# Patient Record
Sex: FEMALE | URBAN - METROPOLITAN AREA
[De-identification: names, ages, dates, MRNs, and addresses within clinical notes are randomized per-mention and may not be internally consistent; named-entity substitution may affect disease eponyms.]

---

## 2024-03-13 ENCOUNTER — OV CON (OUTPATIENT)
Dept: URBAN - METROPOLITAN AREA CLINIC 118 | Facility: CLINIC | Age: 59
End: 2024-03-13
Payer: COMMERCIAL

## 2024-03-13 VITALS
SYSTOLIC BLOOD PRESSURE: 116 MMHG | TEMPERATURE: 97.7 F | HEART RATE: 71 BPM | BODY MASS INDEX: 24.46 KG/M2 | WEIGHT: 146.8 LBS | HEIGHT: 65 IN | DIASTOLIC BLOOD PRESSURE: 73 MMHG

## 2024-03-13 DIAGNOSIS — K30 INDIGESTION: ICD-10-CM

## 2024-03-13 DIAGNOSIS — Z86.19 HISTORY OF HELICOBACTER PYLORI INFECTION: ICD-10-CM

## 2024-03-13 PROBLEM — 162031009: Status: ACTIVE | Noted: 2024-03-13

## 2024-03-13 PROCEDURE — 99203 OFFICE O/P NEW LOW 30 MIN: CPT | Performed by: INTERNAL MEDICINE

## 2024-03-13 NOTE — HPI-TODAY'S VISIT:
Patient was referred by Dr. Jair Ortega for dyspepsia. A copy of this document will be sent to the physician.   reports was given amoxicillin in Jan and had allergic reaction lots of gerd with that so took pepcid and changed diet and otherwise better no GI complaints  also referred for mild anemia, saw heme, they did repeat labs and the hgb and iron studies were normal

## 2024-03-19 LAB — RESULT:: (no result)
